# Patient Record
Sex: FEMALE | Race: WHITE | NOT HISPANIC OR LATINO | URBAN - METROPOLITAN AREA
[De-identification: names, ages, dates, MRNs, and addresses within clinical notes are randomized per-mention and may not be internally consistent; named-entity substitution may affect disease eponyms.]

---

## 2019-09-10 ENCOUNTER — HOSPITAL ENCOUNTER (EMERGENCY)
Facility: HOSPITAL | Age: 74
Discharge: HOME | End: 2019-09-10
Attending: EMERGENCY MEDICINE | Admitting: EMERGENCY MEDICINE
Payer: MEDICARE

## 2019-09-10 ENCOUNTER — APPOINTMENT (EMERGENCY)
Dept: CARDIOLOGY | Facility: HOSPITAL | Age: 74
End: 2019-09-10
Payer: MEDICARE

## 2019-09-10 ENCOUNTER — APPOINTMENT (EMERGENCY)
Dept: RADIOLOGY | Facility: HOSPITAL | Age: 74
End: 2019-09-10
Attending: EMERGENCY MEDICINE
Payer: MEDICARE

## 2019-09-10 VITALS
HEART RATE: 84 BPM | SYSTOLIC BLOOD PRESSURE: 129 MMHG | DIASTOLIC BLOOD PRESSURE: 66 MMHG | BODY MASS INDEX: 28.17 KG/M2 | OXYGEN SATURATION: 96 % | RESPIRATION RATE: 16 BRPM | TEMPERATURE: 98.3 F | HEIGHT: 64 IN | WEIGHT: 165 LBS

## 2019-09-10 DIAGNOSIS — M71.21 BAKER'S CYST OF KNEE, RIGHT: ICD-10-CM

## 2019-09-10 DIAGNOSIS — M25.461 KNEE EFFUSION, RIGHT: Primary | ICD-10-CM

## 2019-09-10 LAB — BSA FOR ECHO PROCEDURE: 1.84 M2

## 2019-09-10 PROCEDURE — 93971 EXTREMITY STUDY: CPT | Mod: RT

## 2019-09-10 PROCEDURE — 99283 EMERGENCY DEPT VISIT LOW MDM: CPT | Mod: 25

## 2019-09-10 PROCEDURE — 73564 X-RAY EXAM KNEE 4 OR MORE: CPT | Mod: RT

## 2019-09-10 PROCEDURE — 93971 EXTREMITY STUDY: CPT | Mod: 26,RT | Performed by: SURGERY

## 2019-09-10 RX ORDER — ASCORBIC ACID 250 MG
250 TABLET ORAL DAILY
COMMUNITY

## 2019-09-10 RX ORDER — CETIRIZINE HYDROCHLORIDE 5 MG/1
5 TABLET ORAL DAILY
COMMUNITY

## 2019-09-10 RX ORDER — ACETAMINOPHEN 500 MG
TABLET ORAL
COMMUNITY

## 2019-09-10 RX ORDER — LEVOTHYROXINE SODIUM 50 UG/1
50 TABLET ORAL
COMMUNITY

## 2019-09-10 RX ORDER — HYDROXYZINE HYDROCHLORIDE 50 MG/1
50 TABLET, FILM COATED ORAL DAILY
COMMUNITY
Start: 2019-08-01

## 2019-09-10 RX ORDER — ACETAMINOPHEN AND CODEINE PHOSPHATE 300; 30 MG/1; MG/1
1-2 TABLET ORAL EVERY 6 HOURS PRN
Qty: 12 TABLET | Refills: 0 | Status: SHIPPED | OUTPATIENT
Start: 2019-09-10 | End: 2019-09-20

## 2019-09-10 ASSESSMENT — ENCOUNTER SYMPTOMS
WOUND: 0
VOMITING: 0
ABDOMINAL PAIN: 0
CHILLS: 0
JOINT SWELLING: 1
FEVER: 0
BACK PAIN: 0
NECK PAIN: 0
SHORTNESS OF BREATH: 0
NAUSEA: 0
HEADACHES: 0

## 2019-09-10 NOTE — ED PROVIDER NOTES
HPI     Chief Complaint   Patient presents with   • Knee Pain       73 y/o f c/o R knee pain and swelling.  Patient denies fall or injury.  Reports swelling started last week and she was seen at urgent care.  Reports she had an x-ray and was told she has fluid in her knee.  Patient was told to take ibuprofen, rice and follow-up with Ortho.  Patient reports she has not seen an orthopedic doctor because she is not from this area, only here visiting from [place] and she is going back there soon.  Patient reports swelling seemed to worsen last night and pain got worse last night.  Patient denies erythema, warmth, calf pain, chest pain, shortness of breath, palpitations.             Patient History     Past Medical History:   Diagnosis Date   • Disease of thyroid gland    • Hypothyroidism    • Mitral valve prolapse        Past Surgical History:   Procedure Laterality Date   • ECTOPIC PREGNANCY SURGERY     • ELBOW SURGERY     • SHOULDER SURGERY         History reviewed. No pertinent family history.    Social History   Substance Use Topics   • Smoking status: Never Smoker   • Smokeless tobacco: Never Used   • Alcohol use Yes      Comment: Socially       Systems Reviewed from Nursing Triage:          Review of Systems     Review of Systems   Constitutional: Negative for chills and fever.   Eyes: Negative for visual disturbance.   Respiratory: Negative for shortness of breath.    Cardiovascular: Negative for chest pain.   Gastrointestinal: Negative for abdominal pain, nausea and vomiting.   Musculoskeletal: Positive for gait problem and joint swelling. Negative for back pain and neck pain.   Skin: Negative for rash and wound.   Neurological: Negative for syncope and headaches.        Physical Exam     ED Triage Vitals [09/10/19 1015]   Temp Heart Rate Resp BP SpO2   36.8 °C (98.3 °F) 92 16 (!) 149/81 96 %      Temp Source Heart Rate Source Patient Position BP Location FiO2 (%) (Set)   Oral -- Sitting Right upper arm --         "             Patient Vitals for the past 24 hrs:   BP Temp Temp src Pulse Resp SpO2 Height Weight   09/10/19 1157 129/66 - - 84 16 96 % - -   09/10/19 1015 (!) 149/81 36.8 °C (98.3 °F) Oral 92 16 96 % 1.626 m (5' 4\") 74.8 kg (165 lb)           Physical Exam   Constitutional: She is oriented to person, place, and time. She appears well-developed and well-nourished.   HENT:   Head: Normocephalic and atraumatic.   Eyes: Pupils are equal, round, and reactive to light. Conjunctivae are normal.   Neck: Normal range of motion. Neck supple.   Cardiovascular: Normal rate, regular rhythm, normal heart sounds and intact distal pulses.  Exam reveals no friction rub.    No murmur heard.  Pulmonary/Chest: Effort normal and breath sounds normal. No respiratory distress. She has no wheezes. She has no rales.   Musculoskeletal:        Right knee: She exhibits decreased range of motion (slight d/t pain and swelling), swelling and effusion. She exhibits no ecchymosis, no deformity, no laceration, no erythema and normal alignment. Tenderness found.        Right ankle: She exhibits normal range of motion, no swelling, no ecchymosis, no deformity, no laceration and normal pulse. No tenderness.        Right upper leg: Normal.        Right lower leg: She exhibits swelling (very mild). She exhibits no tenderness, no bony tenderness, no deformity and no laceration.        Right foot: Normal.   Neurological: She is alert and oriented to person, place, and time. She has normal strength. No cranial nerve deficit or sensory deficit.   Skin: Skin is warm and dry. Capillary refill takes less than 2 seconds.   Psychiatric: She has a normal mood and affect.   Nursing note and vitals reviewed.           General  Date/Time: 9/10/2019 12:21 PM  Performed by: SPENSER GUNTER  Authorized by: VI FRENCH     Consent:     Consent obtained:  Verbal    Consent given by:  Patient    Risks discussed:  Infection, bleeding and incomplete drainage  Indications: "     Indications:  Symptomatic pain relief, knee effusion  Pre-procedure details:     Skin preparation:  ChloraPrep    Preparation: Patient was prepped and draped in the usual sterile fashion    Anesthesia (see MAR for exact dosages):     Anesthesia method:  None  Post-procedure details:     Patient tolerance of procedure:  Tolerated well, no immediate complications  Comments:      R knee joint aspirate, 18 gauge. 40 mL of clear straw colored fluid removed.         ED Course & MDM     Labs Reviewed - No data to display    US venous leg, RL extremity         X-RAY KNEE RIGHT 4+ VIEWS   Final Result   IMPRESSION:      Mild degenerative changes of the right knee with chondrocalcinosis.  Small right   knee joint effusion.                  MDM         ED Course as of Sep 10 1225   Tue Sep 10, 2019   1222 40 mL of clear, straw colored removed from R knee for symptomatic tx of joint effusion. Discussed all results, proper med use, need for f/u with ortho asap and return precautions, pt verb understanding.    [ET]      ED Course User Index  [ET] Lupe Gu PA C         Clinical Impressions as of Sep 10 1225   Knee effusion, right   Baker's cyst of knee, right        Lupe Gu PA C  09/10/19 1225

## 2019-09-10 NOTE — DISCHARGE INSTRUCTIONS
RETURN TO THE ER IF WORSE  Follow up with orthopedic doctor as soon as possible  Take medications as prescribed   Do not take medications while working or driving as they can cause drowsiness

## 2019-09-14 NOTE — ED ATTESTATION NOTE
The patient was evaluated and managed by the physician assistant / nurse practitioner.       Delmis Jason, DO  09/14/19 1787